# Patient Record
Sex: FEMALE | Race: WHITE | Employment: UNEMPLOYED | URBAN - METROPOLITAN AREA
[De-identification: names, ages, dates, MRNs, and addresses within clinical notes are randomized per-mention and may not be internally consistent; named-entity substitution may affect disease eponyms.]

---

## 2023-05-19 ENCOUNTER — HOSPITAL ENCOUNTER (EMERGENCY)
Facility: CLINIC | Age: 73
Discharge: HOME | End: 2023-05-19
Attending: SPECIALIST
Payer: MEDICARE

## 2023-05-19 VITALS
BODY MASS INDEX: 27.31 KG/M2 | DIASTOLIC BLOOD PRESSURE: 61 MMHG | RESPIRATION RATE: 18 BRPM | OXYGEN SATURATION: 97 % | TEMPERATURE: 98.3 F | HEART RATE: 92 BPM | WEIGHT: 160 LBS | SYSTOLIC BLOOD PRESSURE: 117 MMHG | HEIGHT: 64 IN

## 2023-05-19 DIAGNOSIS — S61.213A LACERATION OF LEFT MIDDLE FINGER WITHOUT FOREIGN BODY WITHOUT DAMAGE TO NAIL, INITIAL ENCOUNTER: Primary | ICD-10-CM

## 2023-05-19 PROCEDURE — 12001 RPR S/N/AX/GEN/TRNK 2.5CM/<: CPT

## 2023-05-19 PROCEDURE — 99282 EMERGENCY DEPT VISIT SF MDM: CPT

## 2023-05-19 RX ORDER — ATORVASTATIN CALCIUM 80 MG/1
TABLET, FILM COATED ORAL DAILY
COMMUNITY
Start: 2023-04-21

## 2023-05-19 RX ORDER — ACETAMINOPHEN 500 MG
TABLET ORAL
COMMUNITY
Start: 2022-12-15

## 2023-05-19 RX ORDER — NIACIN 500 MG
TABLET ORAL
COMMUNITY

## 2023-05-19 RX ORDER — CINACALCET 30 MG/1
TABLET, FILM COATED ORAL DAILY
COMMUNITY
Start: 2023-05-03

## 2023-05-19 RX ORDER — ASCORBIC ACID 500 MG
TABLET ORAL DAILY
COMMUNITY
Start: 2023-04-29

## 2023-05-19 RX ORDER — DULOXETIN HYDROCHLORIDE 60 MG/1
CAPSULE, DELAYED RELEASE ORAL DAILY
COMMUNITY
Start: 2023-04-06

## 2023-05-19 RX ORDER — FERROUS SULFATE 325(65) MG
TABLET ORAL
COMMUNITY
Start: 2023-04-29

## 2023-05-19 RX ORDER — ALPRAZOLAM 1 MG/1
TABLET ORAL
COMMUNITY
Start: 2023-04-06

## 2023-05-19 RX ORDER — PANTOPRAZOLE SODIUM 40 MG/1
TABLET, DELAYED RELEASE ORAL DAILY
COMMUNITY
Start: 2023-04-11

## 2023-05-19 RX ORDER — DULOXETIN HYDROCHLORIDE 30 MG/1
CAPSULE, DELAYED RELEASE ORAL DAILY
COMMUNITY

## 2023-05-19 RX ORDER — ASPIRIN 81 MG/81MG
CAPSULE ORAL DAILY
COMMUNITY
Start: 2023-03-24

## 2023-05-19 ASSESSMENT — PAIN - FUNCTIONAL ASSESSMENT: PAIN_FUNCTIONAL_ASSESSMENT: NONE - DENIES PAIN

## 2023-05-20 ASSESSMENT — ENCOUNTER SYMPTOMS
VOMITING: 0
NAUSEA: 0
COUGH: 0
ABDOMINAL PAIN: 0
BACK PAIN: 0
SHORTNESS OF BREATH: 0

## 2023-05-20 NOTE — ED NOTES
Pt. Ambulatory to room # 12 with family, gait steady. Pt. C/o cutting left 3rd finger on a kitchen knife. Pt. Alert and oriented x4. RR equal and non labored. NaD noted. Call light within reach.      Walker Keller RN  05/19/23 2087

## 2023-05-20 NOTE — DISCHARGE INSTRUCTIONS
PLEASE RETURN TO THE EMERGENCY DEPARTMENT IMMEDIATELY if your symptoms worsen in anyway. You should immediately return to the ER for symptoms such as increasing pain, fever, drainage from the wound, warmth or redness around the cut, increasing swelling, numbness or weakness to the extremity, color change or coldness to the extremity    Take your medication as indicated and prescribed. If you are given an antibiotic then, make sure you get the prescription filled and take the antibiotics until finished. It is advised that you keep your wound clean and dry. You may apply antibiotic ointment to the area. Please understand that at this time there is no evidence for a more serious underlying process, but that early in the process of an illness or injury, an emergency department workup can be falsely reassuring. You should contact your family doctor within the next 48 hours for a follow up appointment. Jlely Schmidt!!!    From [facility] ([facility]) and [address] [place] [address] Emergency Services    On behalf of the Emergency Department staff at [facility]), I would like to thank you for giving us the opportunity to address your health care needs and concerns. We hope that during your visit, our service was delivered in a professional and caring manner. Please keep [facility] ([facility]) in mind as we walk with you down the path to your own personal wellness. Please expect an automated text message or email from us so we can ask a few questions about your health and progress. Based on your answers, a clinician may call you back to offer help and instructions. Please understand that early in the process of an illness or injury, an emergency department workup can be falsely reassuring. If you notice any worsening, changing or persistent symptoms please call your family doctor or return to the ER immediately. Tell us how we did during your visit at [email or web address]   and let us know about your experience

## 2023-05-20 NOTE — ED PROVIDER NOTES
Behavior: Behavior normal.         Thought Content: Thought content normal.         DIFFERENTIAL DIAGNOSIS/ MDM:     Differential diagnosis considered: Laceration left middle finger  Will close with Dermabond      DIAGNOSTIC RESULTS     EKG: All EKG's are interpreted by the Emergency Department Physician who either signs or Co-signs this chart in the absence of a cardiologist.    None obtained    RADIOLOGY:   I reviewed the radiologist interpretations:  No orders to display       No results found. LABS:  Labs Reviewed - No data to display      EMERGENCY DEPARTMENT COURSE:   Vitals:    Vitals:    05/19/23 2235   BP: 117/61   Pulse: 92   Resp: 18   Temp: 98.3 °F (36.8 °C)   TempSrc: Oral   SpO2: 97%   Weight: 72.6 kg (160 lb)   Height: 5' 4\" (1.626 m)     -------------------------  BP: 117/61, Temp: 98.3 °F (36.8 °C), Pulse: 92, Respirations: 18    No orders of the defined types were placed in this encounter. During the ED course, the laceration was closed with the help of Dermabond by myself. Patient tolerated the procedure well. There is no evidence of any bleeding from the laceration site. Wound care instructions were given and patient was discharged home in a stable condition. I have reviewed the disposition diagnosis with the patient and or their family/guardian. I have answered their questions and given discharge instructions. They voiced understanding of these instructions and did not have any further questions or complaints. Re-evaluation Notes    Patient is feeling much better and resting comfortably. PROCEDURES:  None    FINAL IMPRESSION      1.  Laceration of left middle finger without foreign body without damage to nail, initial encounter          DISPOSITION/PLAN   DISPOSITION Decision To Discharge 05/19/2023 10:51:53 PM      Condition on Disposition    Stable    PATIENT REFERRED TO:  Mckay Love, [address] [place] [address] [facility]  [address] [facility] [address]  [phone]    Call in 2 days  For